# Patient Record
Sex: FEMALE | Race: WHITE
[De-identification: names, ages, dates, MRNs, and addresses within clinical notes are randomized per-mention and may not be internally consistent; named-entity substitution may affect disease eponyms.]

---

## 2018-05-10 ENCOUNTER — HOSPITAL ENCOUNTER (OUTPATIENT)
Dept: HOSPITAL 25 - OREAST | Age: 23
Discharge: HOME | End: 2018-05-10
Attending: ORAL & MAXILLOFACIAL SURGERY
Payer: COMMERCIAL

## 2018-05-10 VITALS — DIASTOLIC BLOOD PRESSURE: 75 MMHG | SYSTOLIC BLOOD PRESSURE: 133 MMHG

## 2018-05-10 DIAGNOSIS — J34.3: ICD-10-CM

## 2018-05-10 DIAGNOSIS — J34.2: Primary | ICD-10-CM

## 2018-05-10 PROCEDURE — 81025 URINE PREGNANCY TEST: CPT

## 2018-05-23 NOTE — OP
DATE OF OPERATION:  05/10/18 - [facility]

 

DATE OF BIRTH:  [birth year]

 

SURGEON:  Juvencio Joaquin MD, DMD

 

ASSISTANT:  Hilton Jacob MD

 

ANESTHESIOLOGIST:  Arya Wesley MD

 

ANESTHESIA:  General anesthesia with oral LMA.

 

PRE-OP DIAGNOSIS:  Nasal deformity and nasal airway obstruction.

 

POST-OP DIAGNOSIS:  Nasal deformity and nasal airway obstruction.

 

OPERATIVE PROCEDURE:  Reconstructive septal rhinoplasty with conservative 
inferior turbinate reduction.

 

INDICATIONS FOR PROCEDURE:  The patient has the above-noted diagnosis and has 
expressed an interest in undergoing reconstructive septal rhinoplasty following 
a lengthy and detailed discussion regarding the full range of options, 
alternatives, advantages, and disadvantages of each, potential risks and 
complications of the above-noted procedure.  She indicates she understands 
everything and wishes to proceed.

 

DESCRIPTION OF PROCEDURE:  The patient was brought to the operating room, 
placed under general anesthesia with LMA placed and secured and the eyes closed 
and taped. Once the patient was in the proper position, universal protocol time-
out procedure was completed and local anesthesia was administered with 4% 
cocaine moistened Codman patties placed intranasally and 0.25% Marcaine with 1:
200,000 epinephrine infiltrated along the septum over the upper lateral 
cartilages and nasal bones and at the area of marginal incisions bilaterally.  
The patient was then prepped and draped in the standard fashion for facial 
reconstructive surgical procedure. Universal protocol time-out procedure was 
again performed and attention was directed to the nose.  The following 
maneuvers were performed to complete the reconstructive septal rhinoplasty.  
Bilateral marginal incisions were connected to an inverted V columellar 
incision so that the skin and soft tissue envelope could be elevated over the 
nasal tip and nasal dorsum.  The septum was approached from above, first on the 
left side then the right side and submucoperichondrial dissection was completed 
allowing for harvesting of the central portion of the quadrangular cartilage 
and removal of a portion of the inferior part of the perpendicular plate of the 
ethmoid with Nancy forceps.  The harvested quadrangular cartilage was used 
to fashion a  graft, which was placed on the left side.  A columellar 
strut which was placed between the medial crura of the lower lateral cartilages 
following bilateral cephalic margin reductions and other maneuvers included 
dorsal reduction with #15 blade and rasps, bilateral medial osteotomies, 
bilateral lateral osteotomies for infracturing to close the open roof deformity 
and dome-defining sutures.  The septal dead space was closed with 4-0 chronic 
on an FS2 in a quilted running horizontal mattress fashion and marginal 
incisions were closed in an interrupted fashion with 5-0 chromic suture.  The 
columellar incision was closed in an interrupted fashion with 6-0 nylon.  The 
inferior turbinates were dressed with electrocautery and outfracturing.  Tape 
and Dermoplast dressing was placed in a standard fashion and trimmed Telfa pads 
were rolled and moistened with bacitracin ointment and placed as bilateral 
intranasal packs.  Estimated blood loss for the procedure was less than 30 cc.  
The patient tolerated the procedure well.  She was allowed to awaken from 
anesthesia, had the LMA removed and was transported to postanesthesia care unit
, awake and stable with no bleeding.  All sponge and needle counts were correct 
at the completion of the procedure.

 

 [id number]/CPS #: [id number]

CARL